# Patient Record
Sex: MALE | Race: BLACK OR AFRICAN AMERICAN | NOT HISPANIC OR LATINO | URBAN - METROPOLITAN AREA
[De-identification: names, ages, dates, MRNs, and addresses within clinical notes are randomized per-mention and may not be internally consistent; named-entity substitution may affect disease eponyms.]

---

## 2020-01-01 ENCOUNTER — EMERGENCY (EMERGENCY)
Facility: HOSPITAL | Age: 30
LOS: 0 days | Discharge: HOME | End: 2020-01-02
Attending: EMERGENCY MEDICINE
Payer: MEDICAID

## 2020-01-01 VITALS
DIASTOLIC BLOOD PRESSURE: 52 MMHG | SYSTOLIC BLOOD PRESSURE: 102 MMHG | HEART RATE: 108 BPM | OXYGEN SATURATION: 98 % | TEMPERATURE: 99 F | WEIGHT: 154.98 LBS | RESPIRATION RATE: 17 BRPM | HEIGHT: 68 IN

## 2020-01-01 DIAGNOSIS — R10.9 UNSPECIFIED ABDOMINAL PAIN: ICD-10-CM

## 2020-01-01 DIAGNOSIS — R11.2 NAUSEA WITH VOMITING, UNSPECIFIED: ICD-10-CM

## 2020-01-01 LAB
ALBUMIN SERPL ELPH-MCNC: 4.4 G/DL — SIGNIFICANT CHANGE UP (ref 3.3–5)
ALP SERPL-CCNC: 106 U/L — SIGNIFICANT CHANGE UP (ref 40–120)
ALT FLD-CCNC: 26 U/L — SIGNIFICANT CHANGE UP (ref 12–78)
ANION GAP SERPL CALC-SCNC: 15 MMOL/L — SIGNIFICANT CHANGE UP (ref 5–17)
APPEARANCE UR: CLEAR — SIGNIFICANT CHANGE UP
AST SERPL-CCNC: 20 U/L — SIGNIFICANT CHANGE UP (ref 15–37)
BACTERIA # UR AUTO: ABNORMAL
BASOPHILS # BLD AUTO: 0.05 K/UL — SIGNIFICANT CHANGE UP (ref 0–0.2)
BASOPHILS NFR BLD AUTO: 0.3 % — SIGNIFICANT CHANGE UP (ref 0–2)
BILIRUB SERPL-MCNC: 0.9 MG/DL — SIGNIFICANT CHANGE UP (ref 0.2–1.2)
BILIRUB UR-MCNC: NEGATIVE — SIGNIFICANT CHANGE UP
BUN SERPL-MCNC: 22 MG/DL — SIGNIFICANT CHANGE UP (ref 7–23)
CALCIUM SERPL-MCNC: 8.7 MG/DL — SIGNIFICANT CHANGE UP (ref 8.5–10.1)
CHLORIDE SERPL-SCNC: 108 MMOL/L — SIGNIFICANT CHANGE UP (ref 96–108)
CO2 SERPL-SCNC: 19 MMOL/L — LOW (ref 22–31)
COLOR SPEC: YELLOW — SIGNIFICANT CHANGE UP
CREAT SERPL-MCNC: 0.92 MG/DL — SIGNIFICANT CHANGE UP (ref 0.5–1.3)
D DIMER BLD IA.RAPID-MCNC: <150 NG/ML DDU — SIGNIFICANT CHANGE UP
DIFF PNL FLD: ABNORMAL
EOSINOPHIL # BLD AUTO: 0.01 K/UL — SIGNIFICANT CHANGE UP (ref 0–0.5)
EOSINOPHIL NFR BLD AUTO: 0.1 % — SIGNIFICANT CHANGE UP (ref 0–6)
GLUCOSE SERPL-MCNC: 69 MG/DL — LOW (ref 70–99)
GLUCOSE UR QL: NEGATIVE MG/DL — SIGNIFICANT CHANGE UP
HCT VFR BLD CALC: 48.9 % — SIGNIFICANT CHANGE UP (ref 39–50)
HGB BLD-MCNC: 16.8 G/DL — SIGNIFICANT CHANGE UP (ref 13–17)
IMM GRANULOCYTES NFR BLD AUTO: 0.6 % — SIGNIFICANT CHANGE UP (ref 0–1.5)
KETONES UR-MCNC: ABNORMAL
LEUKOCYTE ESTERASE UR-ACNC: NEGATIVE — SIGNIFICANT CHANGE UP
LIDOCAIN IGE QN: 33 U/L — LOW (ref 73–393)
LYMPHOCYTES # BLD AUTO: 0.56 K/UL — LOW (ref 1–3.3)
LYMPHOCYTES # BLD AUTO: 3.5 % — LOW (ref 13–44)
MCHC RBC-ENTMCNC: 29.7 PG — SIGNIFICANT CHANGE UP (ref 27–34)
MCHC RBC-ENTMCNC: 34.4 GM/DL — SIGNIFICANT CHANGE UP (ref 32–36)
MCV RBC AUTO: 86.4 FL — SIGNIFICANT CHANGE UP (ref 80–100)
MONOCYTES # BLD AUTO: 0.49 K/UL — SIGNIFICANT CHANGE UP (ref 0–0.9)
MONOCYTES NFR BLD AUTO: 3.1 % — SIGNIFICANT CHANGE UP (ref 2–14)
NEUTROPHILS # BLD AUTO: 14.73 K/UL — HIGH (ref 1.8–7.4)
NEUTROPHILS NFR BLD AUTO: 92.4 % — HIGH (ref 43–77)
NITRITE UR-MCNC: NEGATIVE — SIGNIFICANT CHANGE UP
NRBC # BLD: 0 /100 WBCS — SIGNIFICANT CHANGE UP (ref 0–0)
PH UR: 5 — SIGNIFICANT CHANGE UP (ref 5–8)
PLATELET # BLD AUTO: 235 K/UL — SIGNIFICANT CHANGE UP (ref 150–400)
POTASSIUM SERPL-MCNC: 3.7 MMOL/L — SIGNIFICANT CHANGE UP (ref 3.5–5.3)
POTASSIUM SERPL-SCNC: 3.7 MMOL/L — SIGNIFICANT CHANGE UP (ref 3.5–5.3)
PROT SERPL-MCNC: 7.9 GM/DL — SIGNIFICANT CHANGE UP (ref 6–8.3)
PROT UR-MCNC: NEGATIVE MG/DL — SIGNIFICANT CHANGE UP
RBC # BLD: 5.66 M/UL — SIGNIFICANT CHANGE UP (ref 4.2–5.8)
RBC # FLD: 13.1 % — SIGNIFICANT CHANGE UP (ref 10.3–14.5)
SODIUM SERPL-SCNC: 142 MMOL/L — SIGNIFICANT CHANGE UP (ref 135–145)
SP GR SPEC: 1 — LOW (ref 1.01–1.02)
UROBILINOGEN FLD QL: NEGATIVE MG/DL — SIGNIFICANT CHANGE UP
WBC # BLD: 15.93 K/UL — HIGH (ref 3.8–10.5)
WBC # FLD AUTO: 15.93 K/UL — HIGH (ref 3.8–10.5)
WBC UR QL: SIGNIFICANT CHANGE UP

## 2020-01-01 PROCEDURE — 74177 CT ABD & PELVIS W/CONTRAST: CPT | Mod: 26

## 2020-01-01 PROCEDURE — 99284 EMERGENCY DEPT VISIT MOD MDM: CPT

## 2020-01-01 RX ORDER — KETOROLAC TROMETHAMINE 30 MG/ML
30 SYRINGE (ML) INJECTION ONCE
Refills: 0 | Status: DISCONTINUED | OUTPATIENT
Start: 2020-01-01 | End: 2020-01-01

## 2020-01-01 RX ORDER — MORPHINE SULFATE 50 MG/1
4 CAPSULE, EXTENDED RELEASE ORAL ONCE
Refills: 0 | Status: DISCONTINUED | OUTPATIENT
Start: 2020-01-01 | End: 2020-01-01

## 2020-01-01 RX ORDER — CYCLOBENZAPRINE HYDROCHLORIDE 10 MG/1
10 TABLET, FILM COATED ORAL ONCE
Refills: 0 | Status: COMPLETED | OUTPATIENT
Start: 2020-01-01 | End: 2020-01-01

## 2020-01-01 RX ORDER — ONDANSETRON 8 MG/1
4 TABLET, FILM COATED ORAL ONCE
Refills: 0 | Status: COMPLETED | OUTPATIENT
Start: 2020-01-01 | End: 2020-01-01

## 2020-01-01 RX ORDER — SODIUM CHLORIDE 9 MG/ML
1000 INJECTION, SOLUTION INTRAVENOUS ONCE
Refills: 0 | Status: COMPLETED | OUTPATIENT
Start: 2020-01-01 | End: 2020-01-01

## 2020-01-01 RX ADMIN — Medication 30 MILLIGRAM(S): at 22:31

## 2020-01-01 RX ADMIN — MORPHINE SULFATE 4 MILLIGRAM(S): 50 CAPSULE, EXTENDED RELEASE ORAL at 16:36

## 2020-01-01 RX ADMIN — SODIUM CHLORIDE 1000 MILLILITER(S): 9 INJECTION, SOLUTION INTRAVENOUS at 23:00

## 2020-01-01 RX ADMIN — SODIUM CHLORIDE 1000 MILLILITER(S): 9 INJECTION, SOLUTION INTRAVENOUS at 16:09

## 2020-01-01 RX ADMIN — SODIUM CHLORIDE 1000 MILLILITER(S): 9 INJECTION, SOLUTION INTRAVENOUS at 18:15

## 2020-01-01 RX ADMIN — MORPHINE SULFATE 4 MILLIGRAM(S): 50 CAPSULE, EXTENDED RELEASE ORAL at 18:33

## 2020-01-01 RX ADMIN — ONDANSETRON 4 MILLIGRAM(S): 8 TABLET, FILM COATED ORAL at 16:10

## 2020-01-01 RX ADMIN — CYCLOBENZAPRINE HYDROCHLORIDE 10 MILLIGRAM(S): 10 TABLET, FILM COATED ORAL at 22:31

## 2020-01-01 RX ADMIN — MORPHINE SULFATE 4 MILLIGRAM(S): 50 CAPSULE, EXTENDED RELEASE ORAL at 16:10

## 2020-01-01 RX ADMIN — Medication 30 MILLIGRAM(S): at 23:01

## 2020-01-01 RX ADMIN — SODIUM CHLORIDE 1000 MILLILITER(S): 9 INJECTION, SOLUTION INTRAVENOUS at 19:15

## 2020-01-01 RX ADMIN — MORPHINE SULFATE 4 MILLIGRAM(S): 50 CAPSULE, EXTENDED RELEASE ORAL at 19:15

## 2020-01-01 NOTE — ED ADULT NURSE NOTE - OBJECTIVE STATEMENT
received er bed 17 c/o vomiting since morning with pain r ribs area denies diarrhea denies abdominal pain denies recent injury or known trauma lungs clear b/l no swelling/deformity noted

## 2020-01-01 NOTE — ED ADULT NURSE NOTE - ED STAT RN HANDOFF DETAILS
Report received from RN at this time. Assessment available on [facility]. will continue to monitor

## 2020-01-01 NOTE — ED PROVIDER NOTE - PATIENT PORTAL LINK FT
You can access the FollowMyHealth Patient Portal offered by [facility] by registering at the following website: [email or web address]. By joining [employer]’s FollowMyHealth portal, you will also be able to view your health information using other applications (apps) compatible with our system.

## 2020-01-01 NOTE — ED ADULT NURSE NOTE - ED STAT RN HANDOFF DETAILS 2
Report endorsed to oncagustina SALTER RN for my break coverage. Report given to covering RN and patient informed during rounding Safety checks compld this shift/Safety rounds completed hourly.  Fall +skin precs in place. Any issues endorsed to oncoming RN for follow up. RN Assumed patient's care for coverage.

## 2020-01-01 NOTE — ED ADULT NURSE NOTE - NSIMPLEMENTINTERV_GEN_ALL_ED
Implemented All Universal Safety Interventions:  [place] to call system. Call bell, personal items and telephone within reach. Instruct patient to call for assistance. Room bathroom lighting operational. Non-slip footwear when patient is off stretcher. Physically safe environment: no spills, clutter or unnecessary equipment. Stretcher in lowest position, wheels locked, appropriate side rails in place.

## 2020-01-01 NOTE — ED PROVIDER NOTE - PROGRESS NOTE DETAILS
pt endorsed by dr mancilla pending CT abd. Pt noted with rt flank pain, worse with palpation and movement. pt denies trauma. Of note, pt reports he was out drinking and celebrating and has no idea what happened/if he fell. PT then slept at friends house and started to walk home and that when he felt the pain. pt noted with wcc but given IVF, possible related to stress/drinking,. will do ct chest and repeat WCC after IVF. pt sleeping comfortably, no distress. Lab values do not require emergent intervention. CT scan demonstrates no acute pathology. likely muscular. dc with motrin, flexeril. Discussed results and outcome of testing with the patient.  Patient given copy of available results. Patient advised to please follow up with their PMD within the next 24 hours and return to the Emergency Department for worsening symptoms or any other concerns.

## 2020-01-02 VITALS
TEMPERATURE: 98 F | HEART RATE: 72 BPM | SYSTOLIC BLOOD PRESSURE: 122 MMHG | RESPIRATION RATE: 18 BRPM | DIASTOLIC BLOOD PRESSURE: 73 MMHG | OXYGEN SATURATION: 98 %

## 2020-01-02 LAB
CULTURE RESULTS: SIGNIFICANT CHANGE UP
FLU A RESULT: SIGNIFICANT CHANGE UP
FLU A RESULT: SIGNIFICANT CHANGE UP
FLUAV AG NPH QL: SIGNIFICANT CHANGE UP
FLUBV AG NPH QL: SIGNIFICANT CHANGE UP
HCT VFR BLD CALC: 38.4 % — LOW (ref 39–50)
HGB BLD-MCNC: 13.5 G/DL — SIGNIFICANT CHANGE UP (ref 13–17)
MCHC RBC-ENTMCNC: 30.1 PG — SIGNIFICANT CHANGE UP (ref 27–34)
MCHC RBC-ENTMCNC: 35.2 GM/DL — SIGNIFICANT CHANGE UP (ref 32–36)
MCV RBC AUTO: 85.5 FL — SIGNIFICANT CHANGE UP (ref 80–100)
NRBC # BLD: 0 /100 WBCS — SIGNIFICANT CHANGE UP (ref 0–0)
PLATELET # BLD AUTO: 189 K/UL — SIGNIFICANT CHANGE UP (ref 150–400)
RBC # BLD: 4.49 M/UL — SIGNIFICANT CHANGE UP (ref 4.2–5.8)
RBC # FLD: 12.9 % — SIGNIFICANT CHANGE UP (ref 10.3–14.5)
RSV RESULT: SIGNIFICANT CHANGE UP
RSV RNA RESP QL NAA+PROBE: SIGNIFICANT CHANGE UP
SPECIMEN SOURCE: SIGNIFICANT CHANGE UP
WBC # BLD: 12.18 K/UL — HIGH (ref 3.8–10.5)
WBC # FLD AUTO: 12.18 K/UL — HIGH (ref 3.8–10.5)

## 2020-01-02 PROCEDURE — 71250 CT THORAX DX C-: CPT | Mod: 26

## 2020-01-02 PROCEDURE — 71046 X-RAY EXAM CHEST 2 VIEWS: CPT | Mod: 26

## 2020-01-02 RX ORDER — LIDOCAINE 4 G/100G
1 CREAM TOPICAL ONCE
Refills: 0 | Status: COMPLETED | OUTPATIENT
Start: 2020-01-02 | End: 2020-01-02

## 2020-01-02 RX ORDER — CYCLOBENZAPRINE HYDROCHLORIDE 10 MG/1
1 TABLET, FILM COATED ORAL
Qty: 15 | Refills: 0
Start: 2020-01-02 | End: 2020-01-06

## 2020-01-02 RX ORDER — SODIUM CHLORIDE 9 MG/ML
1000 INJECTION INTRAMUSCULAR; INTRAVENOUS; SUBCUTANEOUS ONCE
Refills: 0 | Status: COMPLETED | OUTPATIENT
Start: 2020-01-02 | End: 2020-01-02

## 2020-01-02 RX ORDER — OXYCODONE AND ACETAMINOPHEN 5; 325 MG/1; MG/1
1 TABLET ORAL ONCE
Refills: 0 | Status: DISCONTINUED | OUTPATIENT
Start: 2020-01-02 | End: 2020-01-02

## 2020-01-02 RX ORDER — IBUPROFEN 200 MG
1 TABLET ORAL
Qty: 15 | Refills: 0
Start: 2020-01-02 | End: 2020-01-06

## 2020-01-02 RX ADMIN — OXYCODONE AND ACETAMINOPHEN 1 TABLET(S): 5; 325 TABLET ORAL at 01:22

## 2020-01-02 RX ADMIN — LIDOCAINE 1 PATCH: 4 CREAM TOPICAL at 04:15

## 2020-01-02 RX ADMIN — SODIUM CHLORIDE 1000 MILLILITER(S): 9 INJECTION INTRAMUSCULAR; INTRAVENOUS; SUBCUTANEOUS at 03:00

## 2020-01-02 RX ADMIN — SODIUM CHLORIDE 1000 MILLILITER(S): 9 INJECTION INTRAMUSCULAR; INTRAVENOUS; SUBCUTANEOUS at 04:15

## 2020-01-02 RX ADMIN — OXYCODONE AND ACETAMINOPHEN 1 TABLET(S): 5; 325 TABLET ORAL at 00:52

## 2022-09-21 NOTE — ED ADULT NURSE NOTE - CHIEF COMPLAINT QUOTE
Patient: Bubba Craig Date of Service: 2022   : 1990 MRN: [id number]     SUBJECTIVE:     Chief Complaint   Patient presents with   • Follow-up   • Diabetes   • Blood Draw     pending orders, please add A1C and vitamin d level to check after 50,000 unit therapy    • MEDICATION ISSUE     Stopped taking lexapro due to side effects   • Medication Refill     Refill pending   • Imm/Inj     Pt would like flu vaccine       HISTORY OF PRESENT ILLNESS:  Bubba Craig is a 32 year old male who presents today for  Follow up     Diabetes type 1-patient did not see the endocrinologist, states he has an appointment next month   fasting BS=- 156  Yesterday -316    Hypertension-blood pressure elevated  Patient not taking lisinopril  Noncompliant with the medication  Will refill medicine    Psoriasis-did not make an appointment with the dermatology  Using clobetasol cream  Lesions are slightly better  Encourage patient to see the dermatologist      Anxiety  Patient not taking Lexapro  Would like to try a different medicine        Visit Vitals  BP (!) 156/100   Pulse 74   Temp 98.5 °F (36.9 °C) (Temporal)   Resp 16   Ht 5' 9\" (1.753 m)   Wt 83.5 kg (184 lb)   BMI 27.17 kg/m²       No visits with results within 3 Month(s) from this visit.   Latest known visit with results is:   Office Visit on 06/15/2022   Component Date Value   • Sodium 06/15/2022 137    • Potassium 06/15/2022 4.6    • Chloride 06/15/2022 105    • Carbon Dioxide 06/15/2022 26    • Anion Gap 06/15/2022 11    • Glucose 06/15/2022 224 (A)   • BUN 06/15/2022 22 (A)   • Creatinine 06/15/2022 1.17    • Glomerular Filtration Ra* 06/15/2022 85    • BUN/ Creatinine Ratio 06/15/2022 19    • Calcium 06/15/2022 9.9         Review of Systems   HENT: Negative.    Respiratory: Negative for cough, shortness of breath and wheezing.    Cardiovascular: Negative for chest pain, palpitations and leg swelling.   Genitourinary: Negative.    Skin: Positive for rash.   Hematological:  c/o abdominal pain and vomitting Negative for adenopathy.   Psychiatric/Behavioral: The patient is nervous/anxious.           Physical Exam  Vitals and nursing note reviewed.   HENT:      Head: Normocephalic.      Right Ear: Tympanic membrane and ear canal normal.      Left Ear: Tympanic membrane and ear canal normal.      Nose: Nose normal.      Mouth/Throat:      Mouth: Mucous membranes are moist.   Eyes:      Pupils: Pupils are equal, round, and reactive to light.   Cardiovascular:      Rate and Rhythm: Normal rate.      Pulses: Normal pulses.      Heart sounds: Normal heart sounds.   Pulmonary:      Effort: Pulmonary effort is normal.      Breath sounds: Normal breath sounds.   Skin:     General: Skin is warm.      Capillary Refill: Capillary refill takes less than 2 seconds.      Findings: Rash present.      Comments: Psoriatic rashes    Neurological:      General: No focal deficit present.      Mental Status: He is alert.   Psychiatric:         Mood and Affect: Mood normal.         PAST MEDICAL HISTORY:  Past Medical History:   Diagnosis Date   • Diabetes mellitus (CMS/[facility])     iddm   • left Retinal detachment    • Plaque psoriasis     Current patch left arm & right leg   • Retinal detachment, right (partial)    • Right Ulnar fracture 11/2019     Patient Active Problem List   Diagnosis   • Type 1 diabetes mellitus without complication (CMS/HCC)   • Psoriasis   • Hyperlipidemia   • Hypertension   • Microalbuminuria       MEDICATIONS:  Current Outpatient Medications   Medication Sig   • hydroCHLOROthiazide (HYDRODIURIL) 12.5 MG tablet Take 1 tablet by mouth daily.   • fenofibrate (Tricor) 145 MG tablet Take 1 tablet by mouth daily.   • Tapinarof 1 % Cream Apply 60 g topically daily.   • FLUoxetine (PROzac) 10 MG capsule Take 1 capsule by mouth daily.   • lisinopril (ZESTRIL) 20 MG tablet Take 1 tablet by mouth daily.   • TRUEplus Pen Needles 32G X 4 MM Misc USE TO INJECT INSULIN 4 TIMES DAILY   • Glucagon (Baqsimi One Pack) 3 MG/DOSE Powder  Spray 3 mg in each nostril.   • atorvastatin (Lipitor) 20 MG tablet Take 1 tablet by mouth daily.   • clobetasol (TEMOVATE) 0.05 % cream Apply topically 2 times daily.   • Continuous Blood Gluc Sensor (Dexcom G6 Sensor) Misc USE EVERY 10 DAYS WITH DEXCOM TRANSMITTER AS DIRECTED   • Continuous Blood Gluc Transmit (Dexcom G6 Transmitter) Misc USE AND REPLACE EVERY 90 DAYS   • insulin glargine (LANTUS SOLOSTAR) 100 UNIT/ML pen-injector INJECT 18 UNITS SUBCUTANEOUSLY AT BEDTIME, PRIME 2 UNITS BEFORE EACH DOSE (Patient taking differently: INJECT 27 UNITS SUBCUTANEOUSLY IN AM, 23 UNITS AT BEDTIME; PRIME 2 UNITS BEFORE EACH DOSE)   • Insulin Lispro, 1 Unit Dial, (HUMALOG KWIKPEN) 100 UNIT/ML pen-injector INJECT 10 UNITS  THREE TIMES DAILY BEFORE  MEALS.  PRIME  2  UNITS  BEFORE  EACH  DOSE     No current facility-administered medications for this visit.       ALLERGIES:  ALLERGIES:  No Known Allergies      ASSESSMENT AND PLAN:     Hypertension, unspecified type  -Noncompliant with medications  Discussed the importance of blood pressure control  Discussed on lifestyle modifications low-salt diet  hydroCHLOROthiazide (HYDRODIURIL) 12.5 MG tablet; Take 1 tablet by mouth daily.  Dispense: 90 tablet; Refill: 0  - lisinopril (ZESTRIL) 20 MG tablet; Take 1 tablet by mouth daily.  Dispense: 90 tablet; Refill: 1    Hypertriglyceridemia  - fenofibrate (Tricor) 145 MG tablet; Take 1 tablet by mouth daily.  Dispense: 90 tablet; Refill: 0    Psoriasis  - Tapinarof 1 % Cream; Apply 60 g topically daily.  Dispense: 60 g; Refill: 1  -Continue clobetasol cream  Please follow-up with dermatologist  Discussed on new medications for psoriasis management-could be prescribed by dermatologist  Type 1 diabetes mellitus without complication (CMS/[facility])  -Patient to follow-up with endocrinology  GLYCOHEMOGLOBIN; Future  - SERVICE TO OPHTHALMOLOGY    Need for prophylactic vaccination and inoculation against influenza  - INFLUENZA QUADRIVALENT SPLIT  PRES FREE 0.5 ML VACC, IM (FLULAVAL,FLUARIX,FLUZONE)    Anxiety  - FLUoxetine (PROzac) 10 MG capsule; Take 1 capsule by mouth daily.  Dispense: 30 capsule; Refill: 0    Hyperlipidemia, unspecified hyperlipidemia type  - atorvastatin (Lipitor) 20 MG tablet; Take 1 tablet by mouth daily       Type 1 diabetes mellitus without complication (CMS/HCC)  -Fasting BS- 200s  Instructed pt  To follow up with [facility]    MICROALBUMIN URINE RANDOM; Future       Follow-up in 3 months.  Verbalized understanding     Disease process explained to the patient.  All questions answered to the best of my abilities.  Anticipatory guidance provided.  Appropriate medication usage, risks and benefits discussed.  ER precautions discussed for worsening symptoms.  Patient stated clear understanding of all instructions and agrees with above plan.     Electronically signed by Teena Gross CNP on 09/26/22.

## 2023-01-02 ENCOUNTER — EMERGENCY (EMERGENCY)
Facility: HOSPITAL | Age: 33
LOS: 0 days | Discharge: HOME | End: 2023-01-02
Attending: EMERGENCY MEDICINE
Payer: MEDICAID

## 2023-01-02 VITALS
TEMPERATURE: 99 F | HEIGHT: 67 IN | HEART RATE: 79 BPM | SYSTOLIC BLOOD PRESSURE: 130 MMHG | RESPIRATION RATE: 17 BRPM | DIASTOLIC BLOOD PRESSURE: 87 MMHG | WEIGHT: 199.96 LBS | OXYGEN SATURATION: 97 %

## 2023-01-02 DIAGNOSIS — F10.90 ALCOHOL USE, UNSPECIFIED, UNCOMPLICATED: ICD-10-CM

## 2023-01-02 PROBLEM — Z78.9 OTHER SPECIFIED HEALTH STATUS: Chronic | Status: ACTIVE | Noted: 2020-01-01

## 2023-01-02 PROCEDURE — 99284 EMERGENCY DEPT VISIT MOD MDM: CPT

## 2023-01-02 NOTE — ED ADULT NURSE REASSESSMENT NOTE - NS ED NURSE REASSESS COMMENT FT1
Pt aaox4, expresses that he wants to leave. DC paperwork given to pt. Pt ambulatory with steady gait. Respirations even and unlabored. NAD noted at this time.

## 2023-01-02 NOTE — ED PROVIDER NOTE - CONSTITUTIONAL, MLM
Well appearing, awake, alert, oriented to person, place, time/situation and in no apparent distress.  ETOH on breath normal...

## 2023-01-02 NOTE — ED PROVIDER NOTE - PATIENT PORTAL LINK FT
You can access the FollowMyHealth Patient Portal offered by [facility] by registering at the following website: [email or web address]. By joining [employer]’s FollowMyHealth portal, you will also be able to view your health information using other applications (apps) compatible with our system.

## 2023-01-02 NOTE — ED ADULT NURSE NOTE - IN THE PAST 12 MONTHS HAVE YOU USED DRUGS OTHER THAN THOSE REQUIRED FOR MEDICAL REASON?
Your Prothrombin time/INR  is in therapeutic range. Continue coumadin dosing as directed by the coumadin clinic. No

## 2023-01-02 NOTE — ED ADULT NURSE NOTE - OBJECTIVE STATEMENT
32M aaox3 ambulatory bibems from home activated by the GF secondary to argument in the house where th GF called EMS and patient was brought here in the ED for medical eval. patient admitted to drinking alcohol celebrating new year, patient c/o left wrist pain secondary to handcuff. Patient hyperverbal but cooperative. No signs of trauma or injury. VS WDL. Seen and evaluated by Dr Rivera. Patient denies any SI/HI.

## 2023-01-02 NOTE — ED PROVIDER NOTE - OBJECTIVE STATEMENT
This patient is a 32 year old man brought in by police for suspected alcohol intoxication.  As per EMS the patient's girlfriend called the police because he refused to leave her home and he was drunk.  Patient has no complaints this time.  He denies suicidal and homicidal ideation as well as hallucinations.

## 2023-01-02 NOTE — ED PROVIDER NOTE - CLINICAL SUMMARY MEDICAL DECISION MAKING FREE TEXT BOX
Patient with alcohol use domestic dispute not under police custody.  Patient agitated but ambulating with steady gait and oriented x 3 clinically sober.  Will discharge patient Patient with alcohol use domestic dispute not under police custody.  Patient agitated but ambulating with steady gait and oriented x 3 clinically sober.  Will discharge patient.    Attempts made to call the patient's sister to  the patient or pay for a cab/uber.  Sister did not answer the phone.

## 2023-01-02 NOTE — ED ADULT TRIAGE NOTE - CHIEF COMPLAINT QUOTE
pt presents to the ED with c/o intoxication, states he has been drinking tequila and vodka, refused to leave his GF's house, she called the , denies ETOH Abuse and drug abuse

## 2023-01-02 NOTE — ED PROVIDER NOTE - NSFOLLOWUPINSTRUCTIONS_ED_ALL_ED_FT
1) Do not abuse alcohol  2) Follow up with your primary care doctor  3) Return to the ER for worsening or concerning symptoms